# Patient Record
Sex: MALE | Race: OTHER | HISPANIC OR LATINO | Employment: UNEMPLOYED | URBAN - METROPOLITAN AREA
[De-identification: names, ages, dates, MRNs, and addresses within clinical notes are randomized per-mention and may not be internally consistent; named-entity substitution may affect disease eponyms.]

---

## 2023-03-04 ENCOUNTER — HOSPITAL ENCOUNTER (EMERGENCY)
Facility: MEDICAL CENTER | Age: 28
End: 2023-03-04
Attending: EMERGENCY MEDICINE

## 2023-03-04 ENCOUNTER — APPOINTMENT (OUTPATIENT)
Dept: RADIOLOGY | Facility: MEDICAL CENTER | Age: 28
End: 2023-03-04
Attending: EMERGENCY MEDICINE

## 2023-03-04 VITALS
RESPIRATION RATE: 12 BRPM | OXYGEN SATURATION: 98 % | TEMPERATURE: 98.5 F | BODY MASS INDEX: 33.76 KG/M2 | DIASTOLIC BLOOD PRESSURE: 99 MMHG | WEIGHT: 202.6 LBS | SYSTOLIC BLOOD PRESSURE: 144 MMHG | HEART RATE: 87 BPM | HEIGHT: 65 IN

## 2023-03-04 DIAGNOSIS — R07.9 CHEST PAIN, UNSPECIFIED TYPE: ICD-10-CM

## 2023-03-04 DIAGNOSIS — M54.9 MUSCULOSKELETAL BACK PAIN: ICD-10-CM

## 2023-03-04 DIAGNOSIS — E86.0 DEHYDRATION: ICD-10-CM

## 2023-03-04 DIAGNOSIS — R11.2 NAUSEA AND VOMITING, UNSPECIFIED VOMITING TYPE: ICD-10-CM

## 2023-03-04 LAB
ALBUMIN SERPL BCP-MCNC: 4.7 G/DL (ref 3.2–4.9)
ALBUMIN/GLOB SERPL: 1.7 G/DL
ALP SERPL-CCNC: 65 U/L (ref 30–99)
ALT SERPL-CCNC: 11 U/L (ref 2–50)
ANION GAP SERPL CALC-SCNC: 12 MMOL/L (ref 7–16)
APTT PPP: 31.1 SEC (ref 24.7–36)
AST SERPL-CCNC: 15 U/L (ref 12–45)
BASOPHILS # BLD AUTO: 0.2 % (ref 0–1.8)
BASOPHILS # BLD: 0.03 K/UL (ref 0–0.12)
BILIRUB SERPL-MCNC: 0.6 MG/DL (ref 0.1–1.5)
BUN SERPL-MCNC: 4 MG/DL (ref 8–22)
CALCIUM ALBUM COR SERPL-MCNC: 8.9 MG/DL (ref 8.5–10.5)
CALCIUM SERPL-MCNC: 9.5 MG/DL (ref 8.5–10.5)
CHLORIDE SERPL-SCNC: 103 MMOL/L (ref 96–112)
CO2 SERPL-SCNC: 23 MMOL/L (ref 20–33)
CREAT SERPL-MCNC: 0.66 MG/DL (ref 0.5–1.4)
EKG IMPRESSION: NORMAL
EOSINOPHIL # BLD AUTO: 0.01 K/UL (ref 0–0.51)
EOSINOPHIL NFR BLD: 0.1 % (ref 0–6.9)
ERYTHROCYTE [DISTWIDTH] IN BLOOD BY AUTOMATED COUNT: 41.1 FL (ref 35.9–50)
GFR SERPLBLD CREATININE-BSD FMLA CKD-EPI: 132 ML/MIN/1.73 M 2
GLOBULIN SER CALC-MCNC: 2.8 G/DL (ref 1.9–3.5)
GLUCOSE SERPL-MCNC: 147 MG/DL (ref 65–99)
HCT VFR BLD AUTO: 42.5 % (ref 42–52)
HGB BLD-MCNC: 14.7 G/DL (ref 14–18)
IMM GRANULOCYTES # BLD AUTO: 0.07 K/UL (ref 0–0.11)
IMM GRANULOCYTES NFR BLD AUTO: 0.5 % (ref 0–0.9)
INR PPP: 1.14 (ref 0.87–1.13)
LACTATE SERPL-SCNC: 1.2 MMOL/L (ref 0.5–2)
LACTATE SERPL-SCNC: 2.8 MMOL/L (ref 0.5–2)
LIPASE SERPL-CCNC: 25 U/L (ref 11–82)
LYMPHOCYTES # BLD AUTO: 1.63 K/UL (ref 1–4.8)
LYMPHOCYTES NFR BLD: 12 % (ref 22–41)
MCH RBC QN AUTO: 29.5 PG (ref 27–33)
MCHC RBC AUTO-ENTMCNC: 34.6 G/DL (ref 33.7–35.3)
MCV RBC AUTO: 85.3 FL (ref 81.4–97.8)
MONOCYTES # BLD AUTO: 1.16 K/UL (ref 0–0.85)
MONOCYTES NFR BLD AUTO: 8.5 % (ref 0–13.4)
NEUTROPHILS # BLD AUTO: 10.73 K/UL (ref 1.82–7.42)
NEUTROPHILS NFR BLD: 78.7 % (ref 44–72)
NRBC # BLD AUTO: 0 K/UL
NRBC BLD-RTO: 0 /100 WBC
PLATELET # BLD AUTO: 249 K/UL (ref 164–446)
PMV BLD AUTO: 9.6 FL (ref 9–12.9)
POTASSIUM SERPL-SCNC: 3.6 MMOL/L (ref 3.6–5.5)
PROT SERPL-MCNC: 7.5 G/DL (ref 6–8.2)
PROTHROMBIN TIME: 14.5 SEC (ref 12–14.6)
RBC # BLD AUTO: 4.98 M/UL (ref 4.7–6.1)
SODIUM SERPL-SCNC: 138 MMOL/L (ref 135–145)
TROPONIN T SERPL-MCNC: 12 NG/L (ref 6–19)
TROPONIN T SERPL-MCNC: 18 NG/L (ref 6–19)
WBC # BLD AUTO: 13.6 K/UL (ref 4.8–10.8)

## 2023-03-04 PROCEDURE — 83605 ASSAY OF LACTIC ACID: CPT | Mod: 91

## 2023-03-04 PROCEDURE — 83690 ASSAY OF LIPASE: CPT

## 2023-03-04 PROCEDURE — 36415 COLL VENOUS BLD VENIPUNCTURE: CPT

## 2023-03-04 PROCEDURE — 85025 COMPLETE CBC W/AUTO DIFF WBC: CPT

## 2023-03-04 PROCEDURE — 84484 ASSAY OF TROPONIN QUANT: CPT

## 2023-03-04 PROCEDURE — 93005 ELECTROCARDIOGRAM TRACING: CPT

## 2023-03-04 PROCEDURE — 85610 PROTHROMBIN TIME: CPT

## 2023-03-04 PROCEDURE — 80053 COMPREHEN METABOLIC PANEL: CPT

## 2023-03-04 PROCEDURE — 99284 EMERGENCY DEPT VISIT MOD MDM: CPT

## 2023-03-04 PROCEDURE — 85730 THROMBOPLASTIN TIME PARTIAL: CPT

## 2023-03-04 PROCEDURE — 74175 CTA ABDOMEN W/CONTRAST: CPT

## 2023-03-04 PROCEDURE — 700105 HCHG RX REV CODE 258: Performed by: EMERGENCY MEDICINE

## 2023-03-04 PROCEDURE — 94760 N-INVAS EAR/PLS OXIMETRY 1: CPT

## 2023-03-04 PROCEDURE — 700117 HCHG RX CONTRAST REV CODE 255: Performed by: EMERGENCY MEDICINE

## 2023-03-04 PROCEDURE — 93005 ELECTROCARDIOGRAM TRACING: CPT | Performed by: EMERGENCY MEDICINE

## 2023-03-04 RX ORDER — ONDANSETRON 4 MG/1
4 TABLET, ORALLY DISINTEGRATING ORAL EVERY 6 HOURS PRN
Qty: 10 TABLET | Refills: 0 | Status: SHIPPED | OUTPATIENT
Start: 2023-03-04

## 2023-03-04 RX ORDER — IBUPROFEN 400 MG/1
400 TABLET ORAL EVERY 6 HOURS PRN
Qty: 18 TABLET | Refills: 0 | Status: SHIPPED | OUTPATIENT
Start: 2023-03-04

## 2023-03-04 RX ORDER — SODIUM CHLORIDE 9 MG/ML
1000 INJECTION, SOLUTION INTRAVENOUS ONCE
Status: COMPLETED | OUTPATIENT
Start: 2023-03-04 | End: 2023-03-04

## 2023-03-04 RX ADMIN — IOHEXOL 100 ML: 350 INJECTION, SOLUTION INTRAVENOUS at 18:06

## 2023-03-04 RX ADMIN — SODIUM CHLORIDE 1000 ML: 9 INJECTION, SOLUTION INTRAVENOUS at 14:53

## 2023-03-04 NOTE — ED TRIAGE NOTES
.  Chief Complaint   Patient presents with    Chest Pain     Onset last night worsening. Mid sternum radiates to left shoulder and jaw.    N/V    Shortness of Breath     Ambulated to triage. Ekg complete on arrival.

## 2023-03-04 NOTE — ED PROVIDER NOTES
ED Provider Note    CHIEF COMPLAINT  Chief Complaint   Patient presents with    Chest Pain     Onset last night worsening. Mid sternum radiates to left shoulder and jaw.    N/V    Shortness of Breath       EXTERNAL RECORDS REVIEWED  Outpatient Notes none available, patient has recently moved to the area    HPI/ROS  LIMITATION TO HISTORY   Select: : None  OUTSIDE HISTORIAN(S):  None available    Frank Gottlieb is a 27 y.o. male who presents for evaluation of discomfort to the chest.  Patient notes symptoms began yesterday, he relates lower abdominal pain cramping in character with associated nausea and vomiting as well as diarrhea, no known fevers but endorses chills.  Is afebrile here in the ED.  Notes abdominal pain is much improved, patient presents today because he developed pain to the bilateral chest last night which woke him from sleep, pain is sharp, severe, worse with movement and deep respiration.  Bilateral pain radiates to both shoulders and to the back of the neck and the mid back.  He is never really had any similar such symptoms, no exertional component, no dyspnea but does note worse with deep respiration.  He took 3 aspirins prior to arrival and pain is now much improved, 3 out of 10.  Nausea and vomiting is also resolved today.    PAST MEDICAL HISTORY   Elevated blood pressure readings noted without history of hypertension, patient is a smoker    SURGICAL HISTORY  patient denies any surgical history    FAMILY HISTORY  History reviewed. No pertinent family history.  No known history of heart/vascular disease in the immediate family    SOCIAL HISTORY  Social History     Tobacco Use    Smoking status: Every Day     Types: Cigarettes    Smokeless tobacco: Not on file   Vaping Use    Vaping Use: Never used   Substance and Sexual Activity    Alcohol use: Yes    Drug use: Yes     Types: Inhaled     Comment: marijuana    Sexual activity: Not on file       CURRENT MEDICATIONS  Home Medications        "Reviewed by Ana Campos R.N. (Registered Nurse) on 03/04/23 at 1400  Med List Status: Partial     Medication Last Dose Status        Patient Ashwin Taking any Medications                           ALLERGIES  No Known Allergies    PHYSICAL EXAM  VITAL SIGNS: BP (!) 144/99   Pulse 87   Temp 36.9 °C (98.5 °F) (Temporal)   Resp 12   Ht 1.651 m (5' 5\")   Wt 91.9 kg (202 lb 9.6 oz)   SpO2 98%   BMI 33.71 kg/m²    General: Alert, no acute distress  Skin: Warm, dry, normal for ethnicity  Head: Normocephalic, atraumatic  Neck: Trachea midline, no tenderness to midline of C-spine, no step-off, no crepitus.  Eye: PERRL, normal conjunctiva, extraocular movements intact.  ENMT: No tenderness nor erythema nor swelling or step-off nor deformity on exam of the mandible or the external ears.  No palpable crepitus.  Cardiovascular: S1, S2, mildly tachycardic, otherwise regular rate and rhythm, No murmur, Normal peripheral perfusion.  No peripheral edema.  Respiratory: Lungs CTA, respirations are non-labored, breath sounds are equal  Gastrointestinal: Soft, mild periumbilical tenderness, no guarding, no rebound, no rigidity.  Bowel sounds are mildly hyperactive, nondistended.  Musculoskeletal: No swelling, no deformity.  No reproducible tenderness to the chest, clavicles, shoulders.  Neurological: Alert and oriented to person, place, time, and situation  Lymphatics: No lymphadenopathy  Psychiatric: Cooperative, mildly anxious, otherwise appropriate mood & affect     DIAGNOSTIC STUDIES / PROCEDURES  EKG  I have independently interpreted this EKG  EKG Interpretation    Interpreted by emergency department physician    Rhythm: normal sinus   Rate: 83  Axis: normal  Ectopy: none  Conduction: normal  ST Segments: no acute change  T Waves: no acute change  Q Waves: none  Early repolarization, no evidence of significant pathologic ST elevation.  Clinical Impression: no acute changes, no previous with which to " compare    LABS  Results for orders placed or performed during the hospital encounter of 03/04/23   CBC w/ Differential   Result Value Ref Range    WBC 13.6 (H) 4.8 - 10.8 K/uL    RBC 4.98 4.70 - 6.10 M/uL    Hemoglobin 14.7 14.0 - 18.0 g/dL    Hematocrit 42.5 42.0 - 52.0 %    MCV 85.3 81.4 - 97.8 fL    MCH 29.5 27.0 - 33.0 pg    MCHC 34.6 33.7 - 35.3 g/dL    RDW 41.1 35.9 - 50.0 fL    Platelet Count 249 164 - 446 K/uL    MPV 9.6 9.0 - 12.9 fL    Neutrophils-Polys 78.70 (H) 44.00 - 72.00 %    Lymphocytes 12.00 (L) 22.00 - 41.00 %    Monocytes 8.50 0.00 - 13.40 %    Eosinophils 0.10 0.00 - 6.90 %    Basophils 0.20 0.00 - 1.80 %    Immature Granulocytes 0.50 0.00 - 0.90 %    Nucleated RBC 0.00 /100 WBC    Neutrophils (Absolute) 10.73 (H) 1.82 - 7.42 K/uL    Lymphs (Absolute) 1.63 1.00 - 4.80 K/uL    Monos (Absolute) 1.16 (H) 0.00 - 0.85 K/uL    Eos (Absolute) 0.01 0.00 - 0.51 K/uL    Baso (Absolute) 0.03 0.00 - 0.12 K/uL    Immature Granulocytes (abs) 0.07 0.00 - 0.11 K/uL    NRBC (Absolute) 0.00 K/uL   Complete Metabolic Panel (CMP)   Result Value Ref Range    Sodium 138 135 - 145 mmol/L    Potassium 3.6 3.6 - 5.5 mmol/L    Chloride 103 96 - 112 mmol/L    Co2 23 20 - 33 mmol/L    Anion Gap 12.0 7.0 - 16.0    Glucose 147 (H) 65 - 99 mg/dL    Bun 4 (L) 8 - 22 mg/dL    Creatinine 0.66 0.50 - 1.40 mg/dL    Calcium 9.5 8.5 - 10.5 mg/dL    AST(SGOT) 15 12 - 45 U/L    ALT(SGPT) 11 2 - 50 U/L    Alkaline Phosphatase 65 30 - 99 U/L    Total Bilirubin 0.6 0.1 - 1.5 mg/dL    Albumin 4.7 3.2 - 4.9 g/dL    Total Protein 7.5 6.0 - 8.2 g/dL    Globulin 2.8 1.9 - 3.5 g/dL    A-G Ratio 1.7 g/dL   Troponin STAT   Result Value Ref Range    Troponin T 12 6 - 19 ng/L   Troponin in two (2) hours   Result Value Ref Range    Troponin T 18 6 - 19 ng/L   Lipase   Result Value Ref Range    Lipase 25 11 - 82 U/L   LACTIC ACID   Result Value Ref Range    Lactic Acid 2.8 (H) 0.5 - 2.0 mmol/L   APTT   Result Value Ref Range    APTT 31.1 24.7 -  36.0 sec   PT/INR   Result Value Ref Range    PT 14.5 12.0 - 14.6 sec    INR 1.14 (H) 0.87 - 1.13   CORRECTED CALCIUM   Result Value Ref Range    Correct Calcium 8.9 8.5 - 10.5 mg/dL   ESTIMATED GFR   Result Value Ref Range    GFR (CKD-EPI) 132 >60 mL/min/1.73 m 2   LACTIC ACID   Result Value Ref Range    Lactic Acid 1.2 0.5 - 2.0 mmol/L   EKG   Result Value Ref Range    Report       [facility] Emergency Dept.    Test Date:  2023  Pt Name:    VERNELL WHITE        Department: ER  MRN:        [id number]                      Room:  Gender:     Male                         Technician: [id number]  :        1995                   Requested By:ER TRIAGE PROTOCOL  Order #:    [id number]                    Reading MD: KATHARINE HOLT MD    Measurements  Intervals                                Axis  Rate:       83                           P:          50  VT:         149                          QRS:        79  QRSD:       110                          T:          55  QT:         347  QTc:        408    Interpretive Statements  Sinus rhythm  ST elev, probable normal early repol pattern  No previous ECG available for comparison  Electronically Signed On 3-4-2023 14:28:38 PST by KATHARINE HOLT MD     Elevated lactic acid; likely secondary to volume depletion.    RADIOLOGY  I have independently interpreted the diagnostic imaging associated with this visit and am waiting the final reading from the radiologist.   My preliminary interpretation is as follows: No obvious dissection  Radiologist interpretation:   CT-CTA COMPLETE THORACOABDOMINAL AORTA   Final Result   Addendum (preliminary) 1 of    Addendum:   Not mentioned above is hepatomegaly with the liver measuring 22 cm in    length possibly due to an incidental prominent normal variant Riedel's    lobe.      Final      1.  Exam limited due to probable cardiac pulsation artifact simulating curvilinear hypodensity adjacent to the  anterior aortic arch and ascending thoracic aorta.   2.  No gross evidence of thoracic aortic aneurysm or dissection. If clinical concern is high, echocardiogram may be of benefit to further evaluate the ascending aorta and arch.   3.  No evidence of abdominal aortic aneurysm or dissection.                 COURSE & MEDICAL DECISION MAKING    ED Observation Status? Yes; I am placing the patient in to an observation status due to a diagnostic uncertainty as well as therapeutic intensity. Patient placed in observation status at 2:41 PM, 3/4/2023.     Observation plan is as follows: Patient pretreated with aspirin prior to arrival and declines analgesia or antiemetics.  He is tachycardic and has been vomiting, high concern for dehydration, 1 L of normal saline will be initiated.  Cardiac work-up including lipase and lactic acid as well as CTA of the thoracoabdominal aorta will be obtained.  Differential includes but is not limited to viral syndrome, myalgias, pneumomediastinum, aortic dissection, acute coronary syndrome, hypertensive urgency, pulmonary embolism, musculoskeletal pain, pancreatitis    1606: Patient reassessed, no longer tachycardic after IV fluids.  I have updated him with work-up thus far, awaiting CT imaging at this time.  He needs to urinate and will head to the restroom; labs for the most part reassuring at this time including troponin.    1840: Patient reassessed, he is feeling well chest pain-free.  Updated with work-up results.    Upon Reevaluation, the patient's condition has: Improved; and will be discharged.    Patient Vitals for the past 24 hrs:   BP Temp Temp src Pulse Resp SpO2 Height Weight   03/04/23 1846 (!) 144/99 36.9 °C (98.5 °F) Temporal 87 12 98 % -- --   03/04/23 1559 134/73 -- -- 85 16 98 % -- --   03/04/23 1416 (!) 166/94 -- -- 96 15 98 % -- --   03/04/23 1415 -- -- -- -- -- 98 % -- --   03/04/23 1414 (!) 166/94 -- -- -- -- 96 % -- --   03/04/23 1352 (!) 169/95 -- -- -- -- -- -- --  "  03/04/23 1350 (!) 180/105 37.1 °C (98.7 °F) Temporal (!) 103 14 97 % 1.651 m (5' 5\") 91.9 kg (202 lb 9.6 oz)        Patient discharged from ED Observation status at 1840 (Time) 3/4/23 (Date).     INITIAL ASSESSMENT, COURSE AND PLAN  Care Narrative: Very pleasant 27-year-old male presents for evaluation of initially abdominal pain and vomiting and now pain to the bilateral chest rating to the back and neck.  He is tachycardic, hypertensive, and is a smoker.  Asymmetric blood pressures noted at the upper extremities (180/105 on the right, 169/95 in the left), CTA of the chest is clearly indicated to evaluate for aortic dissection.  Certainly ACS is on the differential as well and cardiac work-up was obtained.  EKG is nonischemic, troponin is well within normal limits, heart score is  2; low risk stratification reassuring labs. Other studies demonstrate thankfully no apparent evidence of dissection.  He is feeling well, I suspect likely viral syndrome and dehydration with regard to the vomiting and musculoskeletal pain at this point.  He is comfortable with outpatient follow-up.  HYDRATION: Based on the patient's presentation of Dehydration and Tachycardia the patient was given IV fluids. IV Hydration was used because oral hydration was not adequate alone. Upon recheck following hydration, the patient was doing better, tachycardia resolved with IV fluids.      ADDITIONAL PROBLEM LIST  Abdominal pain, nausea and vomiting, chest pain, elevated blood pressure readings  DISPOSITION AND DISCUSSIONS  I have discussed management of the patient with the following physicians and TANESHA's:  NA    Discussion of management with other QHP or appropriate source(s): None     Escalation of care considered, and ultimately not performed:NA    Barriers to care at this time, including but not limited to: Patient does not have established PCP and Patient does not have insurance.     Decision tools and prescription drugs considered " including, but not limited to: HEART Score 2 .    The patient will return for new or worsening symptoms and is stable at the time of discharge.    Patient has had high blood pressure while in the emergency department, felt likely secondary to medical condition. Counseled patient to monitor blood pressure at home and follow up with primary care physician.      DISPOSITION:  Patient will be discharged home in stable condition.    FOLLOW UP:  Brian Yusuf M.D.  [address]  [place] [address]  [phone]    Schedule an appointment as soon as possible for a visit         OUTPATIENT MEDICATIONS:  Discharge Medication List as of 3/4/2023  6:47 PM        START taking these medications    Details   ondansetron (ZOFRAN ODT) 4 MG TABLET DISPERSIBLE Take 1 Tablet by mouth every 6 hours as needed for Nausea/Vomiting., Disp-10 Tablet, R-0, Normal      ibuprofen (MOTRIN) 400 MG Tab Take 1 Tablet by mouth every 6 hours as needed for Moderate Pain or Inflammation., Disp-18 Tablet, R-0, Normal                FINAL DIAGNOSIS  1. Chest pain, unspecified type    2. Dehydration    3. Nausea and vomiting, unspecified vomiting type    4. Musculoskeletal back pain           Electronically signed by: Jewel Vicente M.D., 3/4/2023 2:28 PM

## 2023-03-08 ENCOUNTER — TELEPHONE (OUTPATIENT)
Dept: HEALTH INFORMATION MANAGEMENT | Facility: OTHER | Age: 28
End: 2023-03-08